# Patient Record
(demographics unavailable — no encounter records)

---

## 2025-07-07 NOTE — HISTORY OF PRESENT ILLNESS
[FreeTextEntry1] : Ms. Oneill is a 43 yo female with no significant medical hx presenting with palpitations.   Reports over the weekend- she woke up from sleep startled with palpitations. Apple watch noted her HR to be in the 150s. Associated with chest burning She was seen at  and sent home after workup unremarkable.  She had another episode the following day and was seen in [facility].   Labs including TSH, Mg, CBC/CMP unremarkable and hcg and Ddimer neg   This has happened once before in 8/2024 while at work   PSHx: ovarian cyst removed, D&C  Tobacco use: never Alcohol use: once weekly  Drug use: none  Caffeine: 1 cup coffee daily  Family hx: mother with irregular heart rhythm  Ob hx: 3 pregnancies / 2 children, c/b gestational DM   Social hx:   Exercise: walking

## 2025-07-07 NOTE — REVIEW OF SYSTEMS
[SOB] : no shortness of breath [Chest Discomfort] : no chest discomfort [Lower Ext Edema] : no extremity edema [Palpitations] : palpitations [PND] : no PND [Syncope] : no syncope [Negative] : Heme/Lymph

## 2025-07-07 NOTE — ASSESSMENT
[FreeTextEntry1] : Palpitations  labs including CBC, CMP, TSH, Mg, Ddimer and bhcg neg  will order 2 week zio patch to assess for arrhythmia  recommended Mg supplementation, hydration, minimze caffeine/alcohol   Chest discomfort  isolated, at rest associated with palpitations  will assess for recurrence next visit   will f/u 1 month